# Patient Record
Sex: MALE | Race: WHITE | Employment: FULL TIME | URBAN - METROPOLITAN AREA
[De-identification: names, ages, dates, MRNs, and addresses within clinical notes are randomized per-mention and may not be internally consistent; named-entity substitution may affect disease eponyms.]

---

## 2018-08-30 ENCOUNTER — HOSPITAL ENCOUNTER (OUTPATIENT)
Dept: LAB | Facility: CLINIC | Age: 38
Discharge: HOME | End: 2018-08-30
Attending: GENETIC COUNSELOR, MS | Admitting: OBSTETRICS & GYNECOLOGY
Payer: COMMERCIAL

## 2018-08-30 DIAGNOSIS — Z13.71 SCREENING FOR GENETIC DISEASE CARRIER STATUS: ICD-10-CM

## 2018-08-30 DIAGNOSIS — Z13.71 SCREENING FOR GENETIC DISEASE CARRIER STATUS: Primary | ICD-10-CM

## 2018-08-30 PROCEDURE — 36415 COLL VENOUS BLD VENIPUNCTURE: CPT | Performed by: OBSTETRICS & GYNECOLOGY

## 2018-08-30 PROCEDURE — 40000954 ZZHCL STATISTIC COUNSYL FAMILY PREP: Performed by: OBSTETRICS & GYNECOLOGY

## 2018-08-31 ENCOUNTER — DOCUMENTATION ONLY (OUTPATIENT)
Dept: MATERNAL FETAL MEDICINE | Facility: CLINIC | Age: 38
End: 2018-08-31

## 2018-08-31 NOTE — PROGRESS NOTES
Genetic Counseling note from August 30, 2018    José Miguel and his wife were seen for genetic counseling earlier in this pregnancy for advanced maternal age during this pregnancy, at which time José Miguel's wife had an NIPT analysis drawn, the results of which were normal.  See previous genetic counseling notes for more details.  During the previous genetic counseling appointment, the option of carrier of screening was discussed.  José Miguel's wife had re-contacted our clinic to let us know that she and her  were interested in having carrier screening done, which is why she and José Miguel presented back to clinic today.  This note will summarize our 15 minute conversation.    Today, we again briefly reviewed the option of expanded carrier screening.  We talked about that traditional carrier screening in the past has primarily focused on conditions that are more common in a particular ethnic background.  However, we talked about that more recently, many health care professionals are offering expanded carrier screening beyond just what is recommended based on ethnic background alone.  We talked about that there is not practical clinical genetic testing available currently that would allow for someone to detect every possible genetic syndrome that they could be a carrier of.  However, we talked about that there are expanded carrier screening panels available for a wide variety of autosomal recessive and X-linked genetic conditions associated with significant medical and/or developmental concerns that occur in a variety of different ethnic backgrounds.    We talked about that this carrier screening can be done sequentially (with one partner first having testing, and then the other partner having testing ordered if the first partner is found to be a carrier of any conditions).  On the other hand, other couples wish to have testing concurrently (both partners being tested at the same time) in order to have more complete  "information in a shorter time period; in this case, the couples results are reported together.  We talked about that if both members of couple are both found to be carriers of the same genetic condition, then further discussions could be had about reproductive options.  We briefly talked about that for many genetic conditions, there are options for prenatal testing.  In addition, there are often options for assistive reproductive techniques, such as preimplantation genetic diagnosis (PGD), for couples wishing to avoid pregnancies with a particular genetic disease.    After this conversation, José Miguel and his wife both decided to proceed with an expanded carrier screening panel through [email or web address].  Therefore, consent was reviewed and signed for this testing, and they were sent to the laboratory to have their blood drawn.  José Miguel reported that it was okay with him to just call his wife with both of their test results, and so a \"Consent to Communicate\" form was completed to allow for this. José Miguel and his wife were instructed to watch for an email from [email or web address] regarding billing information. We talked about that I would contact them when test results come back in about 2-3 weeks time.    Madelin Thompson MS, formerly [facility]  Genetic Counselor  Ph: [phone]  Pager: [phone]  "

## 2018-09-07 ENCOUNTER — DOCUMENTATION ONLY (OUTPATIENT)
Dept: MATERNAL FETAL MEDICINE | Facility: CLINIC | Age: 38
End: 2018-09-07

## 2018-09-07 ENCOUNTER — TELEPHONE (OUTPATIENT)
Dept: MATERNAL FETAL MEDICINE | Facility: CLINIC | Age: 38
End: 2018-09-07

## 2018-09-07 NOTE — TELEPHONE ENCOUNTER
"Per José Miguel's request (see consent to communicate form), I called and left José Miguel's wife a \"good news\" message to call me regarding her and José Miguel's blood test results from last week (as he had told me that it was okay to leave a voicemail).  I asked José Miguel's wife to call me back for more details and provided my direct phone number as a call-back number.    Madelin Thompson MS, [facility]  Genetic Counselor  Ph: [phone]  Pager: [phone]    "

## 2018-09-07 NOTE — PROGRESS NOTES
"José Miguel's wife returned the message I left her earlier today and left me a message to call her after 3:30pm.  I was able to connect with José Miguel's wife over the phone and reviewed with her the negative expanded carrier screening results for both her and José Miguel.  (José Miguel had asked me to call his wife with his results.  See \"Consent to Communicate\" form in his chart.)    I reviewed with José Miguel's wife that their negative carrier screening results indicate that the risk for the 176 genetic conditions covered in this expanded carrier screening panel is expected to be very low.  José Miguel's wife reports no questions about these test results.  She was interested in a copy of these results, and so these were released to her through the secure [employer] online portal, per their protocol.    A copy of these test results were faxed to his wife's local prenatal care provider's office.    Madelin Thompson MS, formerly [facility]  Genetic Counselor  Ph: [phone]  Pager: [phone]  "

## 2018-09-08 LAB — LAB SCANNED RESULT: NORMAL
